# Patient Record
Sex: MALE | Race: BLACK OR AFRICAN AMERICAN | NOT HISPANIC OR LATINO | URBAN - METROPOLITAN AREA
[De-identification: names, ages, dates, MRNs, and addresses within clinical notes are randomized per-mention and may not be internally consistent; named-entity substitution may affect disease eponyms.]

---

## 2019-01-24 ENCOUNTER — EMERGENCY (EMERGENCY)
Facility: HOSPITAL | Age: 34
LOS: 1 days | Discharge: HOME | End: 2019-01-24
Attending: EMERGENCY MEDICINE | Admitting: EMERGENCY MEDICINE
Payer: COMMERCIAL

## 2019-01-24 VITALS
RESPIRATION RATE: 18 BRPM | SYSTOLIC BLOOD PRESSURE: 151 MMHG | OXYGEN SATURATION: 100 % | HEART RATE: 89 BPM | TEMPERATURE: 98 F | DIASTOLIC BLOOD PRESSURE: 113 MMHG

## 2019-01-24 LAB
ALBUMIN SERPL ELPH-MCNC: 4.6 G/DL — SIGNIFICANT CHANGE UP (ref 3.3–5)
ALP SERPL-CCNC: 99 U/L — SIGNIFICANT CHANGE UP (ref 40–120)
ALT FLD-CCNC: 39 U/L — SIGNIFICANT CHANGE UP (ref 4–41)
ANION GAP SERPL CALC-SCNC: 12 MMO/L — SIGNIFICANT CHANGE UP (ref 7–14)
AST SERPL-CCNC: 29 U/L — SIGNIFICANT CHANGE UP (ref 4–40)
BASOPHILS # BLD AUTO: 0.03 K/UL — SIGNIFICANT CHANGE UP (ref 0–0.2)
BASOPHILS NFR BLD AUTO: 0.5 % — SIGNIFICANT CHANGE UP (ref 0–2)
BILIRUB SERPL-MCNC: 0.3 MG/DL — SIGNIFICANT CHANGE UP (ref 0.2–1.2)
BUN SERPL-MCNC: 16 MG/DL — SIGNIFICANT CHANGE UP (ref 7–23)
CALCIUM SERPL-MCNC: 9.4 MG/DL — SIGNIFICANT CHANGE UP (ref 8.4–10.5)
CHLORIDE SERPL-SCNC: 104 MMOL/L — SIGNIFICANT CHANGE UP (ref 98–107)
CO2 SERPL-SCNC: 25 MMOL/L — SIGNIFICANT CHANGE UP (ref 22–31)
CREAT SERPL-MCNC: 1 MG/DL — SIGNIFICANT CHANGE UP (ref 0.5–1.3)
EOSINOPHIL # BLD AUTO: 0.12 K/UL — SIGNIFICANT CHANGE UP (ref 0–0.5)
EOSINOPHIL NFR BLD AUTO: 2.2 % — SIGNIFICANT CHANGE UP (ref 0–6)
GLUCOSE SERPL-MCNC: 84 MG/DL — SIGNIFICANT CHANGE UP (ref 70–99)
HCT VFR BLD CALC: 45.8 % — SIGNIFICANT CHANGE UP (ref 39–50)
HGB BLD-MCNC: 15.4 G/DL — SIGNIFICANT CHANGE UP (ref 13–17)
IMM GRANULOCYTES NFR BLD AUTO: 0.5 % — SIGNIFICANT CHANGE UP (ref 0–1.5)
LIDOCAIN IGE QN: 23.4 U/L — SIGNIFICANT CHANGE UP (ref 7–60)
LYMPHOCYTES # BLD AUTO: 2.02 K/UL — SIGNIFICANT CHANGE UP (ref 1–3.3)
LYMPHOCYTES # BLD AUTO: 36.9 % — SIGNIFICANT CHANGE UP (ref 13–44)
MCHC RBC-ENTMCNC: 29.6 PG — SIGNIFICANT CHANGE UP (ref 27–34)
MCHC RBC-ENTMCNC: 33.6 % — SIGNIFICANT CHANGE UP (ref 32–36)
MCV RBC AUTO: 87.9 FL — SIGNIFICANT CHANGE UP (ref 80–100)
MONOCYTES # BLD AUTO: 0.47 K/UL — SIGNIFICANT CHANGE UP (ref 0–0.9)
MONOCYTES NFR BLD AUTO: 8.6 % — SIGNIFICANT CHANGE UP (ref 2–14)
NEUTROPHILS # BLD AUTO: 2.8 K/UL — SIGNIFICANT CHANGE UP (ref 1.8–7.4)
NEUTROPHILS NFR BLD AUTO: 51.3 % — SIGNIFICANT CHANGE UP (ref 43–77)
NRBC # FLD: 0 K/UL — LOW (ref 25–125)
PLATELET # BLD AUTO: 246 K/UL — SIGNIFICANT CHANGE UP (ref 150–400)
PMV BLD: 10.5 FL — SIGNIFICANT CHANGE UP (ref 7–13)
POTASSIUM SERPL-MCNC: 3.8 MMOL/L — SIGNIFICANT CHANGE UP (ref 3.5–5.3)
POTASSIUM SERPL-SCNC: 3.8 MMOL/L — SIGNIFICANT CHANGE UP (ref 3.5–5.3)
PROT SERPL-MCNC: 7.3 G/DL — SIGNIFICANT CHANGE UP (ref 6–8.3)
RBC # BLD: 5.21 M/UL — SIGNIFICANT CHANGE UP (ref 4.2–5.8)
RBC # FLD: 12.6 % — SIGNIFICANT CHANGE UP (ref 10.3–14.5)
SODIUM SERPL-SCNC: 141 MMOL/L — SIGNIFICANT CHANGE UP (ref 135–145)
WBC # BLD: 5.47 K/UL — SIGNIFICANT CHANGE UP (ref 3.8–10.5)
WBC # FLD AUTO: 5.47 K/UL — SIGNIFICANT CHANGE UP (ref 3.8–10.5)

## 2019-01-24 PROCEDURE — 99284 EMERGENCY DEPT VISIT MOD MDM: CPT | Mod: 25

## 2019-01-24 PROCEDURE — 93010 ELECTROCARDIOGRAM REPORT: CPT | Mod: 59

## 2019-01-24 RX ORDER — FAMOTIDINE 10 MG/ML
20 INJECTION INTRAVENOUS ONCE
Qty: 0 | Refills: 0 | Status: COMPLETED | OUTPATIENT
Start: 2019-01-24 | End: 2019-01-24

## 2019-01-24 RX ORDER — SODIUM CHLORIDE 9 MG/ML
1000 INJECTION, SOLUTION INTRAVENOUS ONCE
Qty: 0 | Refills: 0 | Status: COMPLETED | OUTPATIENT
Start: 2019-01-24 | End: 2019-01-24

## 2019-01-24 RX ORDER — ONDANSETRON 8 MG/1
4 TABLET, FILM COATED ORAL ONCE
Qty: 0 | Refills: 0 | Status: COMPLETED | OUTPATIENT
Start: 2019-01-24 | End: 2019-01-24

## 2019-01-24 RX ORDER — DIAZEPAM 5 MG
5 TABLET ORAL ONCE
Qty: 0 | Refills: 0 | Status: DISCONTINUED | OUTPATIENT
Start: 2019-01-24 | End: 2019-01-24

## 2019-01-24 RX ADMIN — ONDANSETRON 4 MILLIGRAM(S): 8 TABLET, FILM COATED ORAL at 19:59

## 2019-01-24 RX ADMIN — Medication 5 MILLIGRAM(S): at 21:01

## 2019-01-24 RX ADMIN — FAMOTIDINE 20 MILLIGRAM(S): 10 INJECTION INTRAVENOUS at 19:59

## 2019-01-24 RX ADMIN — SODIUM CHLORIDE 2000 MILLILITER(S): 9 INJECTION, SOLUTION INTRAVENOUS at 20:00

## 2019-01-24 NOTE — ED PROVIDER NOTE - OBJECTIVE STATEMENT
h/o PUD (not h pylori per patient), c/o nausea and fatigue for 2 weeks, worsening over last several days. h/o PUD (not h pylori per patient), c/o nausea and fatigue for 2 weeks, worsening over last several days.   Patient also reports chest "tightness" over same period, substernal, no radiation, no assoc with exertion/rest

## 2019-01-24 NOTE — ED PROVIDER NOTE - MEDICAL DECISION MAKING DETAILS
nausea/fatigue - benign abdomen, subacute presentation of sx, tolerating PO.  Likely gastritis/PUD/anxiety. will check labs, give fluids/pepcid/zofran, re-assess.  Chest pain - EKG normal, story atypical, normal exam, normal EKG.  sx more likely related to GI issue.

## 2019-01-24 NOTE — ED PROVIDER NOTE - NSFOLLOWUPINSTRUCTIONS_ED_ALL_ED_FT
Drink clear liquids only for now, then gradually progress your diet as your symptoms tolerate, avoiding spicy or fatty foods.  Follow-up with your doctor as well as your psychiatrist (for the anxiety) within 3-5 days for re-evaluation.  May also follow up with a gastroenterologist using the numbers I have provided you, if you continue have severe nausea.  Return immediately for severe worsening symptoms or inability take anything by mouth.

## 2019-01-24 NOTE — ED PROVIDER NOTE - CARE PLAN
Principal Discharge DX:	Nausea  Secondary Diagnosis:	Fatigue Principal Discharge DX:	Nausea  Secondary Diagnosis:	Fatigue  Secondary Diagnosis:	Anxiety

## 2019-01-24 NOTE — ED ADULT TRIAGE NOTE - CHIEF COMPLAINT QUOTE
Pt with co nausea x 2 weeks pt reports pain in abdomen 2/10.  pt reports feeling anxious feels fatigued and foggy and reports feels flutter in his chest.

## 2019-01-31 ENCOUNTER — OUTPATIENT (OUTPATIENT)
Dept: OUTPATIENT SERVICES | Facility: HOSPITAL | Age: 34
LOS: 1 days | Discharge: HOME | End: 2019-01-31

## 2019-01-31 ENCOUNTER — EMERGENCY (EMERGENCY)
Facility: HOSPITAL | Age: 34
LOS: 1 days | Discharge: HOME | End: 2019-01-31
Admitting: EMERGENCY MEDICINE
Payer: COMMERCIAL

## 2019-01-31 VITALS
RESPIRATION RATE: 18 BRPM | OXYGEN SATURATION: 100 % | TEMPERATURE: 98 F | HEART RATE: 89 BPM | DIASTOLIC BLOOD PRESSURE: 93 MMHG | SYSTOLIC BLOOD PRESSURE: 148 MMHG

## 2019-01-31 VITALS
TEMPERATURE: 98 F | DIASTOLIC BLOOD PRESSURE: 97 MMHG | HEART RATE: 88 BPM | RESPIRATION RATE: 18 BRPM | SYSTOLIC BLOOD PRESSURE: 151 MMHG | OXYGEN SATURATION: 100 %

## 2019-01-31 PROBLEM — F41.9 ANXIETY DISORDER, UNSPECIFIED: Chronic | Status: ACTIVE | Noted: 2019-01-24

## 2019-01-31 PROBLEM — K27.9 PEPTIC ULCER, SITE UNSPECIFIED, UNSPECIFIED AS ACUTE OR CHRONIC, WITHOUT HEMORRHAGE OR PERFORATION: Chronic | Status: ACTIVE | Noted: 2019-01-24

## 2019-01-31 PROCEDURE — 99283 EMERGENCY DEPT VISIT LOW MDM: CPT | Mod: 25

## 2019-01-31 PROCEDURE — 93010 ELECTROCARDIOGRAM REPORT: CPT | Mod: 59

## 2019-01-31 RX ORDER — CLONAZEPAM 1 MG
0.5 TABLET ORAL ONCE
Qty: 0 | Refills: 0 | Status: DISCONTINUED | OUTPATIENT
Start: 2019-01-31 | End: 2019-01-31

## 2019-01-31 RX ADMIN — Medication 0.5 MILLIGRAM(S): at 16:05

## 2019-01-31 NOTE — ED PROVIDER NOTE - PROGRESS NOTE DETAILS
Patient received Klonopin 0.5 mg and reports he feels better and will go to [facility] crisis center for follow up I stop Reference #: [id number]  01/25/2019 01/25/2019 clonazepam 0.5 mg tablet  30 30 Shavon Hwang MD

## 2019-01-31 NOTE — ED ADULT TRIAGE NOTE - CHIEF COMPLAINT QUOTE
Pt reports HX of anxiety took last .5mg of klonopin today was at work and had panic attack pt reports felt sob with tightness in chest.

## 2019-01-31 NOTE — ED PROVIDER NOTE - MEDICAL DECISION MAKING DETAILS
This is a 34 year old male PMHX  Anxiety and Pepcid Ulcer came intoday for eval r/t panic attacks. Patient reports he has run out of his Klonopin and having panic attacks. Medical evaluation performed. There is no clinical evidence of intoxication or any acute medical problem requiring immediate intervention. Final disposition will be determined by psychiatrist. This is a 34 year old male PMHX  Anxiety and Pepcid Ulcer came intoday for eval r/t panic attacks. Patient reports he has run out of his Klonopin and having panic attacks. Medical evaluation performed. There is no clinical evidence of intoxication or any acute medical problem requiring immediate intervention. Final disposition will be determined by psychiatrist. Recommend [facility] crisis center Follow up  Provided information

## 2019-01-31 NOTE — ED ADULT NURSE REASSESSMENT NOTE - NS ED NURSE REASSESS COMMENT FT1
Patient in improved and stable condition, discharged as per NP Theodore order, discharge instructions given, pt verbalized understanding and left ER a&ox3 on route to crisis center.

## 2019-01-31 NOTE — ED PROVIDER NOTE - CHPI ED SYMPTOMS NEG
no disorientation/no homicidal/no suicidal/no hallucinations/no change in level of consciousness/no agitation/no paranoia/no weakness/no weight loss

## 2019-01-31 NOTE — ED PROVIDER NOTE - OBJECTIVE STATEMENT
This is a 34 year old male PMHX  Anxiety and Pepcid Ulcer came intoday for eval r/t panic attacks. Patient reports he has run out of his Klonopin and having panic attacks. Reports he was on 3 mg per day and his doctor would only prescribe him 0.5 mg and has been taking two pills. Denies SI/HI Denies AH/VH Denies ETOH/Illicit drugs Reports he took 0.5 mg at work  and is having chest tightness and SOB (EKG NSR HR 88) Reports he had to leave work 2/2 his panic attack

## 2019-02-01 DIAGNOSIS — F32.9 MAJOR DEPRESSIVE DISORDER, SINGLE EPISODE, UNSPECIFIED: ICD-10-CM

## 2019-02-01 DIAGNOSIS — F11.10 OPIOID ABUSE, UNCOMPLICATED: ICD-10-CM

## 2019-02-01 DIAGNOSIS — F41.9 ANXIETY DISORDER, UNSPECIFIED: ICD-10-CM

## 2019-06-04 ENCOUNTER — EMERGENCY (EMERGENCY)
Facility: HOSPITAL | Age: 34
LOS: 1 days | Discharge: HOME | End: 2019-06-04
Admitting: STUDENT IN AN ORGANIZED HEALTH CARE EDUCATION/TRAINING PROGRAM
Payer: SELF-PAY

## 2019-06-04 VITALS
OXYGEN SATURATION: 100 % | RESPIRATION RATE: 18 BRPM | HEART RATE: 88 BPM | DIASTOLIC BLOOD PRESSURE: 108 MMHG | TEMPERATURE: 98 F | SYSTOLIC BLOOD PRESSURE: 163 MMHG

## 2019-06-04 VITALS
RESPIRATION RATE: 18 BRPM | DIASTOLIC BLOOD PRESSURE: 97 MMHG | SYSTOLIC BLOOD PRESSURE: 158 MMHG | OXYGEN SATURATION: 100 % | TEMPERATURE: 97 F

## 2019-06-04 PROCEDURE — 99284 EMERGENCY DEPT VISIT MOD MDM: CPT

## 2019-06-04 RX ORDER — FAMOTIDINE 10 MG/ML
20 INJECTION INTRAVENOUS ONCE
Refills: 0 | Status: COMPLETED | OUTPATIENT
Start: 2019-06-04 | End: 2019-06-04

## 2019-06-04 RX ORDER — KETOROLAC TROMETHAMINE 30 MG/ML
15 SYRINGE (ML) INJECTION ONCE
Refills: 0 | Status: DISCONTINUED | OUTPATIENT
Start: 2019-06-04 | End: 2019-06-04

## 2019-06-04 RX ORDER — OXYCODONE AND ACETAMINOPHEN 5; 325 MG/1; MG/1
1 TABLET ORAL ONCE
Refills: 0 | Status: DISCONTINUED | OUTPATIENT
Start: 2019-06-04 | End: 2019-06-04

## 2019-06-04 RX ORDER — LIDOCAINE 4 G/100G
1 CREAM TOPICAL ONCE
Refills: 0 | Status: COMPLETED | OUTPATIENT
Start: 2019-06-04 | End: 2019-06-04

## 2019-06-04 RX ORDER — DIAZEPAM 5 MG
5 TABLET ORAL ONCE
Refills: 0 | Status: DISCONTINUED | OUTPATIENT
Start: 2019-06-04 | End: 2019-06-04

## 2019-06-04 RX ORDER — MORPHINE SULFATE 50 MG/1
4 CAPSULE, EXTENDED RELEASE ORAL ONCE
Refills: 0 | Status: DISCONTINUED | OUTPATIENT
Start: 2019-06-04 | End: 2019-06-04

## 2019-06-04 RX ADMIN — OXYCODONE AND ACETAMINOPHEN 1 TABLET(S): 5; 325 TABLET ORAL at 18:55

## 2019-06-04 RX ADMIN — Medication 5 MILLIGRAM(S): at 17:25

## 2019-06-04 RX ADMIN — MORPHINE SULFATE 4 MILLIGRAM(S): 50 CAPSULE, EXTENDED RELEASE ORAL at 20:10

## 2019-06-04 RX ADMIN — LIDOCAINE 1 PATCH: 4 CREAM TOPICAL at 19:00

## 2019-06-04 RX ADMIN — Medication 15 MILLIGRAM(S): at 17:25

## 2019-06-04 RX ADMIN — FAMOTIDINE 20 MILLIGRAM(S): 10 INJECTION INTRAVENOUS at 17:26

## 2019-06-04 RX ADMIN — Medication 15 MILLIGRAM(S): at 17:40

## 2019-06-04 RX ADMIN — LIDOCAINE 1 PATCH: 4 CREAM TOPICAL at 17:26

## 2019-06-04 RX ADMIN — OXYCODONE AND ACETAMINOPHEN 1 TABLET(S): 5; 325 TABLET ORAL at 18:25

## 2019-06-04 NOTE — ED PROVIDER NOTE - NSFOLLOWUPINSTRUCTIONS_ED_ALL_ED_FT
Follow with your PMD within 48-72 hours.  Rest, no heavy lifting.  Warm compresses to area. Recommend Ortho consult to discuss possible MRI vs Physical Therapy.  Light walking. Take Flexeril 5 mg every 8 hours as needed for muscle spasm -- causes drowsiness; no drinking alcohol or driving with this medication.  Percocet 1 tablet every 6 hrs as needed for breakthrough discomfort- caution drowsiness while taking this medication- do not drive or operate heavy machinery. Any worsening pain, weakness, numbness, bowel or urinary incontinence or new concerning symptoms return to the Emergency Department.

## 2019-06-04 NOTE — ED PROVIDER NOTE - CLINICAL SUMMARY MEDICAL DECISION MAKING FREE TEXT BOX
35 yo M, nonsmoker with PMH of chronic lower back pain, PUD, anxiety BIBEMS to ER c/o severe thoracic back pain today after stretching at work, no trauma, no urinary/fecal incontinence, no saddle anesthesia, no foot drop, strength 5/5, no red flags on exam, pt received a dose of Fentanyl from EMS, pain not relieved; pt lying in stretcher unable to move due to pain, wants to get better so he can go to work tomorrow. Plan: toradol, pepcid, lidoderm patch, valium, and reassess.   I-stop: Reference #: [id number]: recent clonazepam 0.5 mg (30pills) Rx on 1/25/19: July/2018 hydrocodone-acetaminophen 5-325 mg tablet (150 pills total); percocet 23 pills total in July 2018. 33 yo M, nonsmoker with PMH of chronic lower back pain, PUD, anxiety BIBEMS to ER c/o severe thoracic back pain today after stretching at work, no radiculopathy to lower extremity, no heavy lifting, no trauma, no urinary/fecal incontinence, no saddle anesthesia, no foot drop, strength 5/5, no red flags on exam, pt received a dose of Fentanyl from EMS, pain not relieved; pt lying in stretcher unable to move due to pain, wants to get better so he can go to work tomorrow. Plan: toradol, pepcid, lidoderm patch, valium, and reassess.   I-stop: Reference #: [id number]: recent clonazepam 0.5 mg (30pills) Rx on 1/25/19: July/2018 hydrocodone-acetaminophen 5-325 mg tablet (150 pills total); percocet 23 pills total in July 2018.

## 2019-06-04 NOTE — ED PROVIDER NOTE - OBJECTIVE STATEMENT
35 yo M, nonsmoker with PMH of chronic lower back pain, PUD, anxiety BIBEMS to ER c/o severe thoracic back pain today after stretching at work. Pt states he's having worsening lower back pain, aching, but today he was stretching and twisting his back and experienced severe thoracic pain, unable to move, was standing against the wall, unable to move. Reports pain is worse with ambulation and head movements, felt like "someone is grabbing his spine". States he was follow up with his orthopedic, has MRI scheduled this Friday. Admits getting Fentanyl through IV by EMS, still not getting much relief. Reports taking Flexeril, Advil 660mg at home without improvement for his back pain. Admits hx of back injury x2 at work last year. Denies hx of malignancy, unexplained weight loss, fever, rigors, malaise, recent infection, hx of IVDU, saddle anesthesia/perianal sensory loss, decreased rectal tone, urinary retention, inability to control urine from overflow, weakness, numbness, recent travel, back trauma, or any other complaints.

## 2019-06-04 NOTE — ED ADULT TRIAGE NOTE - CHIEF COMPLAINT QUOTE
Patient brought to Er from office bldg by EMS for c/o back pain for the last couple of days. Fentanyl 15mg IV /NS infusing to left ac 18 gauge.

## 2019-06-04 NOTE — ED PROVIDER NOTE - CONSTITUTIONAL, MLM
normal... well nourished, awake, alert, oriented to person, place, time/situation, lying flat in bed in discomfort, not moving.

## 2019-06-04 NOTE — ED PROVIDER NOTE - NS CPE EDP MUSC THORACIC LOC
no midline spine tenderness, + right paraspinal tenderness, no erythema, no edema, no obvious deformity b/l.

## 2019-06-04 NOTE — ED PROVIDER NOTE - PROGRESS NOTE DETAILS
PA REAGAN: Patient reassessed, sitting comfortably in bed in NAD, states still having severe pain when he sits up. Pt wants something stronger, so he can go to work tomorrow, suggested IV morphine. PA REAGAN: Patient reassessed, lying comfortably in bed in NAD, states still having severe pain when he sits up. Pt wants something stronger, so he can go to work tomorrow, pt suggested IV morphine. Discussed with patient, will try Percocet and reassess. PA REAGAN: Patient reassessed, lying comfortably in chair in NAD, states just got Percocet 20 mins ago. Pt is able to get up from stretcher and ambulate more than 6 steps w/o any assistance. Pt given warm compress. Will continue to monitor and reassess. JELANI KIRK: Spoke with ADN, states mother called hospital, stating that patient is not medicated for his pain. Pt also states he might have issue getting approve w/ MRI. Discussed w/ ADN, patient has opoid use last year, 150 pills of Dilaudid in July; Pt doesn't need urgent MRI in ED since pt has no evidence of red flags on exam. Will continue to monitor and reassess. JELANI KIRK: Patient reassessed, lying comfortably in bed in NAD, states he's fine lying at rest, but pain 8/10 with movement. Reports percocet didn't do anything. Discussed with patient that if he required any IV medication, will need to stay in the hospital. Pt admits he wants something strong and then go home. Will order morphine and reassess. PA REAGAN: Patient reassessed, standing comfortably in result waiting in NAD, denies any complaints. States feeling better, symptoms improved. Pt states he's comfortable going home, wants work note. Reports he'll f/u with his PCP and orthopedic. Pt is medically stable for discharge and follow up with PMD and ortho. The patient was given verbal and written discharge instructions. Specifically, instructions when to return to the ED and when to seek follow-up from their pcp was discussed. Any specialty follow-up was discussed, including how to make an appointment.  Instructions were discussed in simple, plain language and was understood by the patient. The patient understands that should their symptoms worsen or any new symptoms arise, they should return to the ED immediately for further evaluation. All pt's questions were answered. Patient verbalizes understanding.

## 2023-03-14 PROBLEM — Z00.00 ENCOUNTER FOR PREVENTIVE HEALTH EXAMINATION: Status: ACTIVE | Noted: 2023-03-14

## 2024-07-22 ENCOUNTER — APPOINTMENT (OUTPATIENT)
Age: 39
End: 2024-07-22
Payer: MEDICAID

## 2024-07-22 ENCOUNTER — NON-APPOINTMENT (OUTPATIENT)
Age: 39
End: 2024-07-22

## 2024-07-22 DIAGNOSIS — M54.50 LOW BACK PAIN, UNSPECIFIED: ICD-10-CM

## 2024-07-22 DIAGNOSIS — M54.6 PAIN IN THORACIC SPINE: ICD-10-CM

## 2024-07-22 DIAGNOSIS — Z78.9 OTHER SPECIFIED HEALTH STATUS: ICD-10-CM

## 2024-07-22 DIAGNOSIS — M51.36 OTHER INTERVERTEBRAL DISC DEGENERATION, LUMBAR REGION: ICD-10-CM

## 2024-07-22 DIAGNOSIS — M54.2 CERVICALGIA: ICD-10-CM

## 2024-07-22 DIAGNOSIS — M50.30 OTHER CERVICAL DISC DEGENERATION, UNSPECIFIED CERVICAL REGION: ICD-10-CM

## 2024-07-22 PROCEDURE — 72040 X-RAY EXAM NECK SPINE 2-3 VW: CPT

## 2024-07-22 PROCEDURE — 72100 X-RAY EXAM L-S SPINE 2/3 VWS: CPT

## 2024-07-22 PROCEDURE — 99204 OFFICE O/P NEW MOD 45 MIN: CPT | Mod: 25

## 2024-07-22 RX ORDER — BUPRENORPHINE HCL/NALOXONE HCL 8 MG-2 MG
TABLET, SUBLINGUAL SUBLINGUAL
Refills: 0 | Status: ACTIVE | COMMUNITY
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
## 2024-07-22 NOTE — DISCUSSION/SUMMARY
[de-identified] : 38 yo male with C,T, L DDD, asymptomatic currently, recommend HEP, PT, RTO as needed.  Diagnosis, prognosis, natural history and treatment was discussed with patient. Patient was advised if the following symptoms develop: chills, fever,  loss of bladder control, bowel incontinence or urinary retention, numbness/tingling or weakness is present in upper or lower extremities, to go to the nearest emergency room. This may be a new clinical condition not present at the time of the patient visit  that may lead to paralysis and/or death, Patient advised if the above symptoms developed to also call the office immediately to inform us and to go to the nearest emergency room.

## 2024-07-22 NOTE — PHYSICAL EXAM
[Normal] : Gait: normal [UE/LE] : Sensory: Intact in bilateral upper & lower extremities [ALL] : dorsalis pedis, posterior tibial, femoral, popliteal, and radial 2+ and symmetric bilaterally [SLR] : negative straight leg raise [de-identified] : Cervical ROM: NL Lumbar ROM:NL NTTP C/L spine, b/l paracervicals and b/l paraspinals L region.  Skin intact C/L spine. No rashes, ulcers, blisters.  No lymphedema.  Rapid alternating movements- Intact.  Full and non-painful ROM RUE, LUE, RLE, and LLE. Skin intact RUE, LUE, RLE, and LLE. No rashes, blisters, ulcers. NTTP RUE, LUE, RLE, and LLE. No evidence of dislocation or subluxation B/L [de-identified] : 2 views AP and Lat of Cervical Spine from occipital to C7/T1. Read and dictated by Dr. Mickey Allen Board Certified Orthopaedic surgeon and 3 views AP and Lat, flex/ext of Lumbar Spine from [id number] . Read and dictated by Dr. Mickey Allen Board Certified Orthopaedic surgeon 7/22/2024 L5S1 DDD

## 2024-08-09 NOTE — ED PROVIDER NOTE - PRINCIPAL DIAGNOSIS
Quality 130: Documentation Of Current Medications In The Medical Record: Current Medications Documented Detail Level: Generalized Nausea

## 2024-10-25 NOTE — ED ADULT NURSE NOTE - NSIMPLEMENTINTERV_GEN_ALL_ED
Implemented All Universal Safety Interventions:  [place] to call system. Call bell, personal items and telephone within reach. Instruct patient to call for assistance. Room bathroom lighting operational. Non-slip footwear when patient is off stretcher. Physically safe environment: no spills, clutter or unnecessary equipment. Stretcher in lowest position, wheels locked, appropriate side rails in place. Orthopedic